# Patient Record
Sex: FEMALE | Race: BLACK OR AFRICAN AMERICAN | Employment: OTHER | URBAN - METROPOLITAN AREA
[De-identification: names, ages, dates, MRNs, and addresses within clinical notes are randomized per-mention and may not be internally consistent; named-entity substitution may affect disease eponyms.]

---

## 2017-03-07 ENCOUNTER — HOSPITAL ENCOUNTER (OUTPATIENT)
Dept: LAB | Age: 67
Discharge: HOME | End: 2017-03-07
Payer: MEDICARE

## 2017-03-07 ENCOUNTER — OFFICE VISIT (OUTPATIENT)
Dept: OBGYN CLINIC | Age: 67
End: 2017-03-07

## 2017-03-07 VITALS
DIASTOLIC BLOOD PRESSURE: 78 MMHG | WEIGHT: 189.6 LBS | SYSTOLIC BLOOD PRESSURE: 138 MMHG | HEART RATE: 82 BPM | BODY MASS INDEX: 29.76 KG/M2 | RESPIRATION RATE: 20 BRPM | HEIGHT: 67 IN | TEMPERATURE: 97.5 F

## 2017-03-07 DIAGNOSIS — Z90.710 H/O HYSTERECTOMY FOR BENIGN DISEASE: ICD-10-CM

## 2017-03-07 DIAGNOSIS — Z01.419 WELL WOMAN EXAM: Primary | ICD-10-CM

## 2017-03-07 DIAGNOSIS — B97.7 HPV IN FEMALE: ICD-10-CM

## 2017-03-07 PROCEDURE — 88175 CYTOPATH C/V AUTO FLUID REDO: CPT | Performed by: OBSTETRICS & GYNECOLOGY

## 2017-03-07 RX ORDER — FUROSEMIDE 40 MG/1
TABLET ORAL DAILY
COMMUNITY

## 2017-03-07 RX ORDER — LISINOPRIL 20 MG/1
TABLET ORAL DAILY
COMMUNITY

## 2017-03-07 RX ORDER — METFORMIN HYDROCHLORIDE 500 MG/1
1000 TABLET ORAL 2 TIMES DAILY WITH MEALS
COMMUNITY

## 2017-03-07 RX ORDER — ISOSORBIDE MONONITRATE 60 MG/1
TABLET, EXTENDED RELEASE ORAL
COMMUNITY

## 2017-03-07 RX ORDER — METOPROLOL TARTRATE 25 MG/1
TABLET, FILM COATED ORAL 2 TIMES DAILY
COMMUNITY

## 2017-03-07 RX ORDER — TRAMADOL HYDROCHLORIDE 50 MG/1
50 TABLET ORAL
COMMUNITY

## 2017-03-07 RX ORDER — LANOLIN ALCOHOL/MO/W.PET/CERES
CREAM (GRAM) TOPICAL 3 TIMES DAILY
COMMUNITY
End: 2017-09-18

## 2017-03-07 RX ORDER — AMLODIPINE BESYLATE 10 MG/1
10 TABLET ORAL DAILY
COMMUNITY

## 2017-03-07 NOTE — PATIENT INSTRUCTIONS

## 2017-03-07 NOTE — MR AVS SNAPSHOT
Visit Information Date & Time Provider Department Dept. Phone Encounter #  
 3/7/2017  9:30 AM Kezia Barnes OB/ [id number] Follow-up Instructions Return in about 2 years (around 3/7/2019) for well woman exam. Upcoming Health Maintenance Date Due FOBT Q 1 YEAR AGE 50-75 8/13/2000 ZOSTER VACCINE AGE 60> 8/13/2010 INFLUENZA AGE 9 TO ADULT 8/1/2016 BREAST CANCER SCRN MAMMOGRAM 7/19/2018 Allergies as of 3/7/2017  Review Complete On: 3/7/2017 By: Zane Montes LPN Severity Noted Reaction Type Reactions Penicillins High 03/02/2010    Hives, Itching, Swelling Shellfish Containing Products High 03/02/2010    Hives, Swelling Povidone-iodine  03/22/2016    Itching Prednisone Micronized (Bulk)  04/25/2012    Rash, Itching  
 hoarseness Spiriva With Handihaler [Tiotropium Bromide]  09/14/2012    Rash, Itching HAIRLOSS Current Immunizations  Reviewed on 9/22/2016 Name Date Hep A Vaccine 10/24/2012, 9/7/2011 Hep B Vaccine 10/24/2012, 3/28/2012, 9/7/2011 Influenza Vaccine 2/19/2013, 1/31/2012 Influenza Vaccine Whole 1/1/2005 Pneumococcal Vaccine (Unspecified Type) 12/31/2011, 10/1/2010 Tdap 2/13/2015 11:49 AM  
  
 Not reviewed this visit You Were Diagnosed With   
  
 Codes Comments Well woman exam    -  Primary ICD-10-CM: X22.884 ICD-9-CM: V72.31   
 H/O hysterectomy for benign disease     ICD-10-CM: Z90.710 ICD-9-CM: V88.01   
 HPV in female     ICD-10-CM: A63.0 ICD-9-CM: 079.4 Vitals BP Pulse Temp Resp Height(growth percentile) Weight(growth percentile) 138/78 (BP 1 Location: Left arm, BP Patient Position: Sitting) 82 97.5 °F (36.4 °C) (Oral) 20 5' 7\" (1.702 m) 189 lb 9.6 oz (86 kg) BMI OB Status Smoking Status 29.7 kg/m2 Hysterectomy Current Some Day Smoker Vitals History BMI and BSA Data Body Mass Index Body Surface Area 29.7 kg/m 2 2.02 m 2 Preferred Pharmacy Pharmacy Name Phone [facility] [address], [address] [address] [place] Phan. [phone] Your Updated Medication List  
  
   
This list is accurate as of: 3/7/17 10:12 AM.  Always use your most recent med list.  
  
  
  
  
 ADVAIR DISKUS 250-50 mcg/dose diskus inhaler Generic drug:  fluticasone-salmeterol Take 1 Puff by inhalation every twelve (12) hours. albuterol 90 mcg/actuation inhaler Commonly known as:  PROVENTIL HFA, VENTOLIN HFA, PROAIR HFA Take 2 puffs by inhalation every four (4) hours as needed for Wheezing. amitriptyline 50 mg tablet Commonly known as:  ELAVIL Take 25 mg by mouth nightly. amLODIPine 10 mg tablet Commonly known as:  Wayne Del Take  by mouth daily. * ammonium lactate 12 % lotion Commonly known as:  LAC-HYDRIN  
  
 * ammonium lactate 12 % topical cream  
Commonly known as:  AMLACTIN  
  
 diclofenac EC 75 mg EC tablet Commonly known as:  VOLTAREN  
75 mg two (2) times a day. ferrous sulfate 325 mg (65 mg iron) tablet Take  by mouth three (3) times daily. gabapentin 300 mg capsule Commonly known as:  NEURONTIN Take 300 mg by mouth three (3) times daily. HYDROcodone-acetaminophen 5-325 mg per tablet Commonly known as:  Juanetta Rasp Take  by mouth two (2) times daily as needed for Pain.  
  
 hydroxychloroquine 200 mg tablet Commonly known as:  PLAQUENIL Take 400 mg by mouth daily. isosorbide mononitrate ER 60 mg CR tablet Commonly known as:  IMDUR Take  by mouth every morning. LASIX 40 mg tablet Generic drug:  furosemide Take  by mouth daily. linezolid 600 mg tablet Commonly known as:  Canelo Liberian Take 1 Tab by mouth every twelve (12) hours. Indications: abscess  
  
 lisinopril 20 mg tablet Commonly known as:  Daiys Shabnam Take  by mouth daily. LORazepam 0.5 mg tablet Commonly known as:  ATIVAN  
 TAKE ONE TABLET BY MOUTH EVERY 8 HOURS AS NEEDED FOR ANXIETY  
  
 metFORMIN 500 mg tablet Commonly known as:  GLUCOPHAGE Take  by mouth two (2) times daily (with meals). metoprolol tartrate 25 mg tablet Commonly known as:  LOPRESSOR Take  by mouth two (2) times a day. moxifloxacin 400 mg tablet Commonly known as:  [place]m Take 1 Tab by mouth Daily (before breakfast). Indications: INTRA-ABDOMINAL ABSCESS  
  
 omeprazole 40 mg capsule Commonly known as:  PRILOSEC  
TAKE ONE CAPSULE BY MOUTH EVERY DAY  
  
 * oxyCODONE IR 10 mg Tab immediate release tablet Commonly known as:  Ever Ivans Take 10 mg by mouth as needed. * oxyCODONE IR 10 mg Tab immediate release tablet Commonly known as:  Ever Ivans Take 1 Tab by mouth four (4) times daily. Max Daily Amount: 40 mg.  
  
 oxymetazoline 0.025 % ophthalmic solution Commonly known as:  VISINE LR Administer 1 Drop to left eye every six (6) hours as needed. pravastatin 40 mg tablet Commonly known as:  PRAVACHOL Take 1 Tab by mouth nightly. Indications: HYPERCHOLESTEROLEMIA  
  
 silver sulfADIAZINE 1 % topical cream  
Commonly known as:  SILVADENE Apply  to affected area two (2) times a day. traMADol 50 mg tablet Commonly known as:  ULTRAM  
Take 50 mg by mouth every six (6) hours as needed for Pain.  
  
 triamcinolone acetonide 0.1 % topical cream  
Commonly known as:  KENALOG * Notice: This list has 4 medication(s) that are the same as other medications prescribed for you. Read the directions carefully, and ask your doctor or other care provider to review them with you. Follow-up Instructions Return in about 2 years (around 3/7/2019) for well woman exam.  
  
  
Patient Instructions Eating Healthy Foods: Care Instructions Your Care Instructions Eating healthy foods can help lower your risk for disease.  Healthy food gives you energy and keeps your heart strong, your brain active, your muscles working, and your bones strong. A healthy diet includes a variety of foods from the basic food groups: grains, vegetables, fruits, milk and milk products, and meat and beans. Some people may eat more of their favorite foods from only one food group and, as a result, miss getting the nutrients they need. So, it is important to pay attention not only to what you eat but also to what you are missing from your diet. You can eat a healthy, balanced diet by making a few small changes. Follow-up care is a key part of your treatment and safety. Be sure to make and go to all appointments, and call your doctor if you are having problems. Its also a good idea to know your test results and keep a list of the medicines you take. How can you care for yourself at home? Look at what you eat · Keep a food diary for a week or two and record everything you eat or drink. Track the number of servings you eat from each food group. · For a balanced diet every day, eat a variety of: ¨ 6 or more ounce-equivalents of grains, such as cereals, breads, crackers, rice, or pasta, every day. An ounce-equivalent is 1 slice of bread, 1 cup of ready-to-eat cereal, or ½ cup of cooked rice, cooked pasta, or cooked cereal. 
¨ 2½ cups of vegetables, especially: § Dark-green vegetables such as broccoli and spinach. § Orange vegetables such as carrots and sweet potatoes. § Dry beans (such as mata and kidney beans) and peas (such as lentils). ¨ 2 cups of fresh, frozen, or canned fruit. A small apple or 1 banana or orange equals 1 cup. ¨ 3 cups of nonfat or low-fat milk, yogurt, or other milk products. ¨ 5½ ounces of meat and beans, such as chicken, fish, lean meat, beans, nuts, and seeds. One egg, 1 tablespoon of peanut butter, ½ ounce nuts or seeds, or ¼ cup of cooked beans equals 1 ounce of meat. · Learn how to read food labels for serving sizes and ingredients. Fast-food and convenience-food meals often contain few or no fruits or vegetables. Make sure you eat some fruits and vegetables to make the meal more nutritious. · Look at your food diary. For each food group, add up what you have eaten and then divide the total by the number of days. This will give you an idea of how much you are eating from each food group. See if you can find some ways to change your diet to make it more healthy. Start small · Do not try to make dramatic changes to your diet all at once. You might feel that you are missing out on your favorite foods and then be more likely to fail. · Start slowly, and gradually change your habits. Try some of the following: ¨ Use whole wheat bread instead of white bread. ¨ Use nonfat or low-fat milk instead of whole milk. ¨ Eat brown rice instead of white rice, and eat whole wheat pasta instead of white-flour pasta. ¨ Try low-fat cheeses and low-fat yogurt. ¨ Add more fruits and vegetables to meals and have them for snacks. ¨ Add lettuce, tomato, cucumber, and onion to sandwiches. ¨ Add fruit to yogurt and cereal. 
Enjoy food · You can still eat your favorite foods. You just may need to eat less of them. If your favorite foods are high in fat, salt, and sugar, limit how often you eat them, but do not cut them out entirely. · Eat a wide variety of foods. Make healthy choices when eating out · The type of restaurant you choose can help you make healthy choices. Even fast-food chains are now offering more low-fat or healthier choices on the menu. · Choose smaller portions, or take half of your meal home. · When eating out, try: ¨ A veggie pizza with a whole wheat crust or grilled chicken (instead of sausage or pepperoni). ¨ Pasta with roasted vegetables, grilled chicken, or marinara sauce instead of cream sauce. ¨ A vegetable wrap or grilled chicken wrap. ¨ Broiled or poached food instead of fried or breaded items. Make healthy choices easy · Buy packaged, prewashed, ready-to-eat fresh vegetables and fruits, such as baby carrots, salad mixes, and chopped or shredded broccoli and cauliflower. · Buy packaged, presliced fruits, such as melon or pineapple. · Choose 100% fruit or vegetable juice instead of soda. Limit juice intake to 4 to 6 oz (½ to ¾ cup) a day. · Blend low-fat yogurt, fruit juice, and canned or frozen fruit to make a smoothie for breakfast or a snack. Where can you learn more? Go to http://milka-brenda.info/. Enter T756 in the search box to learn more about \"Eating Healthy Foods: Care Instructions. \" Current as of: November 20, 2015 Content Version: 11.1 © 0143-3588 [employer]. Care instructions adapted under license by [employer] (which disclaims liability or warranty for this information). If you have questions about a medical condition or this instruction, always ask your healthcare professional. [address] [place] [address] any warranty or liability for your use of this information. Introducing \[facility] [place] [facility]\"" & HEALTH SERVICES! Jah Ross introduces [employer] patient portal. Now you can access parts of your medical record, email your doctor's office, and request medication refills online. 1. In your internet browser, go to [email or web address] 2. Click on the First Time User? Click Here link in the Sign In box. You will see the New Member Sign Up page. 3. Enter your [employer] Access Code exactly as it appears below. You will not need to use this code after youve completed the sign-up process. If you do not sign up before the expiration date, you must request a new code. · [employer] Access Code: [email or web address] Expires: 3/29/2017  2:29 PM 
 
4. Enter the last four digits of your Social Security Number (xxxx) and Date of Birth (mm/dd/yyyy) as indicated and click Submit. You will be taken to the next sign-up page. 5. Create a [employer] ID. This will be your [employer] login ID and cannot be changed, so think of one that is secure and easy to remember. 6. Create a [employer] password. You can change your password at any time. 7. Enter your Password Reset Question and Answer. This can be used at a later time if you forget your password. 8. Enter your e-mail address. You will receive e-mail notification when new information is available in [address] 9. Click Sign Up. You can now view and download portions of your medical record. 10. Click the Download Summary menu link to download a portable copy of your medical information. If you have questions, please visit the Frequently Asked Questions section of the [employer] website. Remember, [employer] is NOT to be used for urgent needs. For medical emergencies, dial 911. Now available from your iPhone and Android! Please provide this summary of care documentation to your next provider. Your primary care clinician is listed as Ray Browne. If you have any questions after today's visit, please call [phone].

## 2017-03-15 NOTE — PROGRESS NOTES
NEW PATIENT EXAM  Kamille/Post Menopause    SUBJECTIVE: Ashwin Pruitt is a 77 y.o. female who presents for annual exam. No LMP recorded. Patient has had a hysterectomy. GYN History  Menopause:  50  Post menopausal bleeding:   NO      Last pap: 2012  The prior Pap result: hpv  Last Mammogram:  7/2016  Last Dexa Scan: 2015    Past Medical History:   Diagnosis Date    Anxiety state, unspecified 2001    Dr. Linder [facility] Asthma childhood    Dr. Flori Lovell and     CAD (coronary artery disease)     Dr. Lise Crenshaw at [facility] Cellulitis and abscess of leg 05/01/10    Right LE. Hospitalized    Chickenpox childhood    Chronic ITP (idiopathic thrombocytopenia) (HCC)     Dr. Oswaldo Isaacs. Dr. Emily Clement    Chronic obstructive pulmonary disease [facility])    Cecelia Moritz 2001    Dr. Reyna Donovan.  Diabetes ([facility] [address] 2000    Diverticulosis of sigmoid 04/2010    Dr. Herberth Vasquez    HCV (hepatitis C virus) 2004     Dr. Sawyer March - took Arti Bingham finish 1/2016    Hemorrhoids 2010    Internal.  Dr. Malyl Tafoya High cholesterol 2000    Hypertension 2000    Ill-defined condition     high cholesterol    Knee pain     Right. Dr. Javier Hi.  Low back pain 12/27/2009    Dr. Sruthi Hunt.  Rectal bleeding     Right sided sciatica 01/24/10    Dr. Juliann Tomas [facility]) 2013    [facility]. Alex Dias at [facility]. Dr. Melissa Cooley, pulm.  Toe ulcer, right ([facility] [address] 2011    Right great toe. Dr. Sudeep Wright of toe ([facility] [address] 05/01/10    Dr. Jesus Ren    Unspecified vitamin D deficiency 10/2011    Ureteral stone 08/2010    Dr. Marybel Barksdale     Past Surgical History:   Procedure Laterality Date    CABG, ARTERIAL, SINGLE  2005    Dr. Burt Craig  2004? CABG x1 @ [facility]    COLONOSCOPY  04/09/10    Dr. José Miguel Andre  08/2010    Dr. Silverio Louis    EGD  04/09/10    Dr. Herberth Vasquez. due q 10 yrs.     HC LUMB/SACRAL EPID INJ W/CATH SURG & FLUORO EX  02/09/10    due to lumbar radiculopathy and slipped disk. Dr. Sigifredo Collins and Dr. Calvin Whitney, radiologist    HX HEART CATHETERIZATION  2009    HX HERNIA REPAIR  11/30/2009    Rt ventral.  Dr. Neil Floor    due to fibroids.  HX SPLENECTOMY  2004    [facility]   Dr. Donna Gomezinter.  due to low plateletes    HX TONSILLECTOMY       OB History     No data available        Family History   Problem Relation Age of Onset    Diabetes Mother     Heart Disease Mother     Hypertension Mother     Other Mother      severe diverticulitis    Arthritis-osteo Mother     Bipolar Disorder Sister     No Known Problems Brother     Heart Disease Maternal Grandfather     Cancer Maternal Grandfather      colon    Hypertension Maternal Grandfather     Stroke Maternal Grandfather      Social History     Social History    Marital status: SINGLE     Spouse name: N/A    Number of children: N/A    Years of education: N/A     Occupational History    Not on file. Social History Main Topics    Smoking status: Current Some Day Smoker     Packs/day: 1.00     Years: 35.00     Types: Cigarettes    Smokeless tobacco: Never Used    Alcohol use Yes      Comment: socially    Drug use: No    Sexual activity: No     Other Topics Concern    Not on file     Social History Narrative       Current Outpatient Prescriptions   Medication Sig Dispense Refill    amLODIPine (NORVASC) 10 mg tablet Take  by mouth daily.  ferrous sulfate 325 mg (65 mg iron) tablet Take  by mouth three (3) times daily.  furosemide (LASIX) 40 mg tablet Take  by mouth daily.  isosorbide mononitrate ER (IMDUR) 60 mg CR tablet Take  by mouth every morning.  lisinopril (PRINIVIL, ZESTRIL) 20 mg tablet Take  by mouth daily.  metFORMIN (GLUCOPHAGE) 500 mg tablet Take  by mouth two (2) times daily (with meals).  metoprolol tartrate (LOPRESSOR) 25 mg tablet Take  by mouth two (2) times a day.       traMADol (ULTRAM) 50 mg tablet Take 50 mg by mouth every six (6) hours as needed for Pain.  oxyCODONE IR (ROXICODONE) 10 mg tab immediate release tablet Take 1 Tab by mouth four (4) times daily. Max Daily Amount: 40 mg. 20 Tab 0    HYDROcodone-acetaminophen (NORCO) 5-325 mg per tablet Take  by mouth two (2) times daily as needed for Pain.  oxyCODONE IR (ROXICODONE) 10 mg tab immediate release tablet Take 10 mg by mouth as needed.  silver sulfADIAZINE (SILVADENE) 1 % topical cream Apply  to affected area two (2) times a day.  gabapentin (NEURONTIN) 300 mg capsule Take 300 mg by mouth three (3) times daily.  ammonium lactate (AMLACTIN) 12 % topical cream       ammonium lactate (LAC-HYDRIN) 12 % lotion       diclofenac EC (VOLTAREN) 75 mg EC tablet 75 mg two (2) times a day.  triamcinolone acetonide (KENALOG) 0.1 % topical cream       amitriptyline (ELAVIL) 50 mg tablet Take 25 mg by mouth nightly.  LORazepam (ATIVAN) 0.5 mg tablet TAKE ONE TABLET BY MOUTH EVERY 8 HOURS AS NEEDED FOR ANXIETY 30 tablet 0    omeprazole (PRILOSEC) 40 mg capsule TAKE ONE CAPSULE BY MOUTH EVERY DAY 30 capsule 0    albuterol (PROVENTIL HFA, VENTOLIN HFA, PROAIR HFA) 90 mcg/actuation inhaler Take 2 puffs by inhalation every four (4) hours as needed for Wheezing. 1 Inhaler 5    fluticasone-salmeterol (ADVAIR DISKUS) 250-50 mcg/dose diskus inhaler Take 1 Puff by inhalation every twelve (12) hours.  pravastatin (PRAVACHOL) 40 mg tablet Take 1 Tab by mouth nightly. Indications: HYPERCHOLESTEROLEMIA 90 Tab 3    oxymetazoline (VISINE LR) 0.025 % ophthalmic solution Administer 1 Drop to left eye every six (6) hours as needed. 15 mL 0    moxifloxacin (AVELOX) 400 mg tablet Take 1 Tab by mouth Daily (before breakfast). Indications: INTRA-ABDOMINAL ABSCESS 7 Tab 0    linezolid (ZYVOX) 600 mg tablet Take 1 Tab by mouth every twelve (12) hours.  Indications: abscess 14 Tab 0    hydroxychloroquine (PLAQUENIL) 200 mg tablet Take 400 mg by mouth daily. Review of Systems:   Complete review of systems reviewed from social and history data forms. Pertinent positives in HPI. Objective:     Visit Vitals    /78 (BP 1 Location: Left arm, BP Patient Position: Sitting)    Pulse 82    Temp 97.5 °F (36.4 °C) (Oral)    Resp 20    Ht 5' 7\" (1.702 m)    Wt 189 lb 9.6 oz (86 kg)    BMI 29.7 kg/m2       General:  alert, cooperative, no distress, appears stated age   Skin:  Normal.   Lymph Nodes:  Cervical, supraclavicular, and axillary nodes normal.   Breast Exam: normal appearance, no masses or tenderness    Lungs:  clear to auscultation bilaterally   Heart:  regular rate and rhythm, S1, S2 normal, no murmur, click, rub or gallop   Abdomen: soft, non-tender. No masses,  no organomegaly   Back:  Costovertebral angle tenderness absent   Genitourinary: BUS normal. Introitus normal. Normal appearing vaginal epithelium, Vaginal discharge described as normal and physiologic.,  Adnexa normal in size left and right without tenderness. Extremities:  extremities normal, atraumatic, no cyanosis or edema     Neurologic:  negative   Psychiatric:  non focal     ASSESSMENT:      ICD-10-CM ICD-9-CM    1. Well woman exam Z01.419 V72.31 PAP IG, RFX HPV ASCU, 95&32,52([id number])   2. H/O hysterectomy for benign disease Z90.710 V88.01 PAP IG, RFX HPV ASCU, 16&18,45([id number])   3. HPV in female A63.0 079.4 PAP IG, RFX HPV ASCU, 02&53,47([id number])        Follow-up Disposition:  Return in about 2 years (around 3/7/2019) for well woman exam.    Today's care was reviewed and discussed with the patient. She expresses understanding and approval of today's plan.     Lee Zuleta MD

## 2017-04-25 ENCOUNTER — HOSPITAL ENCOUNTER (OUTPATIENT)
Dept: CT IMAGING | Age: 67
Discharge: HOME | End: 2017-04-25
Attending: FAMILY MEDICINE
Payer: MEDICARE

## 2017-04-25 DIAGNOSIS — K46.9 HERNIA, ABDOMINAL: ICD-10-CM

## 2017-04-25 PROCEDURE — 74011636320 HC RX REV CODE- 636/320: Performed by: FAMILY MEDICINE

## 2017-04-25 PROCEDURE — 74177 CT ABD & PELVIS W/CONTRAST: CPT

## 2017-04-25 RX ORDER — SODIUM CHLORIDE 0.9 % (FLUSH) 0.9 %
5-10 SYRINGE (ML) INJECTION
Status: COMPLETED | OUTPATIENT
Start: 2017-04-25 | End: 2017-04-25

## 2017-04-25 RX ADMIN — IOPAMIDOL 100 ML: 755 INJECTION, SOLUTION INTRAVENOUS at 11:03

## 2017-04-25 RX ADMIN — Medication 10 ML: at 11:04

## 2017-07-12 ENCOUNTER — HOSPITAL ENCOUNTER (EMERGENCY)
Age: 67
Discharge: HOME | End: 2017-07-12
Attending: EMERGENCY MEDICINE
Payer: MEDICARE

## 2017-07-12 VITALS
TEMPERATURE: 98.4 F | RESPIRATION RATE: 18 BRPM | WEIGHT: 192 LBS | BODY MASS INDEX: 30.13 KG/M2 | DIASTOLIC BLOOD PRESSURE: 65 MMHG | HEIGHT: 67 IN | OXYGEN SATURATION: 94 % | SYSTOLIC BLOOD PRESSURE: 122 MMHG | HEART RATE: 64 BPM

## 2017-07-12 DIAGNOSIS — L03.116 LEFT LEG CELLULITIS: Primary | ICD-10-CM

## 2017-07-12 LAB
ALBUMIN SERPL BCP-MCNC: 3.2 G/DL (ref 3.5–5)
ALBUMIN/GLOB SERPL: 0.5 {RATIO} (ref 1.1–2.2)
ALP SERPL-CCNC: 107 U/L (ref 45–117)
ALT SERPL-CCNC: 17 U/L (ref 12–78)
ANION GAP BLD CALC-SCNC: 5 MMOL/L (ref 5–15)
AST SERPL W P-5'-P-CCNC: 45 U/L (ref 15–37)
BASOPHILS # BLD AUTO: 0.1 K/UL (ref 0–0.1)
BASOPHILS # BLD: 1 % (ref 0–1)
BILIRUB SERPL-MCNC: 0.5 MG/DL (ref 0.2–1)
BUN SERPL-MCNC: 15 MG/DL (ref 6–20)
BUN/CREAT SERPL: 17 (ref 12–20)
CALCIUM SERPL-MCNC: 8.8 MG/DL (ref 8.5–10.1)
CHLORIDE SERPL-SCNC: 105 MMOL/L (ref 97–108)
CO2 SERPL-SCNC: 29 MMOL/L (ref 21–32)
CREAT SERPL-MCNC: 0.89 MG/DL (ref 0.55–1.02)
DIFFERENTIAL METHOD BLD: ABNORMAL
EOSINOPHIL # BLD: 0.2 K/UL (ref 0–0.4)
EOSINOPHIL NFR BLD: 2 % (ref 0–7)
ERYTHROCYTE [DISTWIDTH] IN BLOOD BY AUTOMATED COUNT: 15.2 % (ref 11.5–14.5)
GLOBULIN SER CALC-MCNC: 6.2 G/DL (ref 2–4)
GLUCOSE BLD STRIP.AUTO-MCNC: 104 MG/DL (ref 65–100)
GLUCOSE SERPL-MCNC: 107 MG/DL (ref 65–100)
HCT VFR BLD AUTO: 41 % (ref 35–47)
HGB BLD-MCNC: 13.1 G/DL (ref 11.5–16)
LYMPHOCYTES # BLD AUTO: 18 % (ref 12–49)
LYMPHOCYTES # BLD: 1.5 K/UL (ref 0.8–3.5)
MCH RBC QN AUTO: 28.2 PG (ref 26–34)
MCHC RBC AUTO-ENTMCNC: 32 G/DL (ref 30–36.5)
MCV RBC AUTO: 88.2 FL (ref 80–99)
MONOCYTES # BLD: 0.7 K/UL (ref 0–1)
MONOCYTES NFR BLD AUTO: 9 % (ref 5–13)
NEUTS SEG # BLD: 5.6 K/UL (ref 1.8–8)
NEUTS SEG NFR BLD AUTO: 70 % (ref 32–75)
PLATELET # BLD AUTO: 96 K/UL (ref 150–400)
POTASSIUM SERPL-SCNC: 6.4 MMOL/L (ref 3.5–5.1)
PROT SERPL-MCNC: 9.4 G/DL (ref 6.4–8.2)
RBC # BLD AUTO: 4.65 M/UL (ref 3.8–5.2)
RBC MORPH BLD: ABNORMAL
SERVICE CMNT-IMP: ABNORMAL
SODIUM SERPL-SCNC: 139 MMOL/L (ref 136–145)
WBC # BLD AUTO: 8.1 K/UL (ref 3.6–11)

## 2017-07-12 PROCEDURE — 96365 THER/PROPH/DIAG IV INF INIT: CPT

## 2017-07-12 PROCEDURE — 99283 EMERGENCY DEPT VISIT LOW MDM: CPT

## 2017-07-12 PROCEDURE — 82962 GLUCOSE BLOOD TEST: CPT

## 2017-07-12 PROCEDURE — 74011250636 HC RX REV CODE- 250/636: Performed by: PHYSICIAN ASSISTANT

## 2017-07-12 PROCEDURE — 85025 COMPLETE CBC W/AUTO DIFF WBC: CPT | Performed by: PHYSICIAN ASSISTANT

## 2017-07-12 PROCEDURE — 36415 COLL VENOUS BLD VENIPUNCTURE: CPT | Performed by: PHYSICIAN ASSISTANT

## 2017-07-12 PROCEDURE — 80053 COMPREHEN METABOLIC PANEL: CPT | Performed by: PHYSICIAN ASSISTANT

## 2017-07-12 RX ORDER — CLINDAMYCIN PHOSPHATE 600 MG/50ML
600 INJECTION INTRAVENOUS
Status: COMPLETED | OUTPATIENT
Start: 2017-07-12 | End: 2017-07-12

## 2017-07-12 RX ORDER — CLINDAMYCIN HYDROCHLORIDE 300 MG/1
300 CAPSULE ORAL 4 TIMES DAILY
Qty: 40 CAP | Refills: 0 | Status: SHIPPED | OUTPATIENT
Start: 2017-07-12 | End: 2017-07-22

## 2017-07-12 RX ADMIN — CLINDAMYCIN PHOSPHATE 600 MG: 600 INJECTION INTRAVENOUS at 12:41

## 2017-07-12 NOTE — ED NOTES
Patient (s)  given copy of dc instructions and 1 paper script(s) and electronic scripts. Patient (s)  verbalized understanding of instructions and script (s). Patient given a current medication reconciliation form and verbalized understanding of their medications. Patient (s) verbalized understanding of the importance of discussing medications with  his or her physician or clinic they will be following up with. Patient alert and oriented and in no acute distress. Patient offered wheelchair from treatment area to hospital entrance, patient declined wheelchair.

## 2017-07-12 NOTE — DISCHARGE INSTRUCTIONS

## 2017-07-12 NOTE — ED PROVIDER NOTES
Patient is a 77 y.o. female presenting with leg pain. The history is provided by the patient. Leg Pain    This is a new problem. Episode onset: 1 wk pt states she fell out of the truck and scraped her leg. The problem occurs constantly. The problem has been gradually worsening (2-3 days ago pt states leg started swelling). The pain is present in the left lower leg. The pain is at a severity of 0/10. The patient is experiencing no pain. Pertinent negatives include no numbness and no tingling. The symptoms are aggravated by palpation. Treatments tried: tylenol and tramadol. The treatment provided no relief. There has been a history of trauma. Past Medical History:   Diagnosis Date    Anxiety state, unspecified 2001    Dr. Albright Pica Asthma childhood    Dr. Kayleigh King and     CAD (coronary artery disease)     Dr. Jorge Lechuga at [facility] Cellulitis and abscess of leg 05/01/10    Right LE. Hospitalized    Chickenpox childhood    Chronic ITP (idiopathic thrombocytopenia) (HCC)     Dr. Irma Peña. Dr. Valerio Phi    Chronic obstructive pulmonary disease [facility])    Brunilda Gonzales 2001    Dr. Boris Leigh.  Diabetes ([facility] [address] 2000    Diverticulosis of sigmoid 04/2010    Dr. Ernesto Arambula    HCV (hepatitis C virus) 2004     Dr. Flor Solorzano Adjutant finish 1/2016    Hemorrhoids 2010    Internal.  Dr. Bethany Alejo High cholesterol 2000    Hypertension 2000    Ill-defined condition     high cholesterol    Knee pain     Right. Dr. Chapo Padron.  Low back pain 12/27/2009    Dr. Juno Gates.  Rectal bleeding     Right sided sciatica 01/24/10    Dr. Tyesha Cain [facility]) 2013    Dr. Fco Sarmiento at [facility]. Dr. Perla Mims, pulm.  Toe ulcer, right ([facility] [address] 2011    Right great toe.   Dr. Noriega [facility] of toe ([facility] [address] 05/01/10    Dr. Shira Lopez    Unspecified vitamin D deficiency 10/2011    Ureteral stone 08/2010    Dr. Francisco Chris       Past Surgical History:   Procedure Laterality Date    CABG, ARTERIAL, SINGLE  2005    Dr. Jitendra De Guzman  2004? CABG x1 @ [facility]    COLONOSCOPY  04/09/10    Dr. Mg Gomez  08/2010    Dr. Suki Arauz    EGD  04/09/10    Dr. Raven Land. due q 10 yrs.  HC LUMB/SACRAL EPID INJ W/CATH SURG & FLUORO EX  02/09/10    due to lumbar radiculopathy and slipped disk. Dr. Dipika Toscano and Dr. Dustin Quevedo, radiologist    HX HEART CATHETERIZATION  2009    HX HERNIA REPAIR  11/30/2009    Rt ventral.  Dr. Radha Rivera    due to fibroids.  HX SPLENECTOMY  2004    [facility]   Dr. Shane Christopher.  due to low plateletes    HX TONSILLECTOMY           Family History:   Problem Relation Age of Onset    Diabetes Mother     Heart Disease Mother     Hypertension Mother     Other Mother      severe diverticulitis    Arthritis-osteo Mother     Bipolar Disorder Sister     No Known Problems Brother     Heart Disease Maternal Grandfather     Cancer Maternal Grandfather      colon    Hypertension Maternal Grandfather     Stroke Maternal Grandfather        Social History     Social History    Marital status: SINGLE     Spouse name: N/A    Number of children: N/A    Years of education: N/A     Occupational History    Not on file. Social History Main Topics    Smoking status: Current Some Day Smoker     Packs/day: 1.00     Years: 35.00     Types: Cigarettes    Smokeless tobacco: Never Used    Alcohol use Yes      Comment: socially    Drug use: No    Sexual activity: No     Other Topics Concern    Not on file     Social History Narrative         ALLERGIES: Penicillins; Shellfish containing products; Povidone-iodine; Prednisone micronized (bulk); and Spiriva with handihaler [tiotropium bromide]    Review of Systems   Constitutional: Negative for fever. Respiratory: Negative for shortness of breath. Cardiovascular: Negative for chest pain. Musculoskeletal: Positive for joint swelling.    Skin: Positive for color change and rash. Neurological: Negative for tingling, speech difficulty, weakness and numbness. Psychiatric/Behavioral: Positive for self-injury. All other systems reviewed and are negative. Vitals:    07/12/17 1145   BP: 126/78   Pulse: 72   Resp: 18   Temp: 98 °F (36.7 °C)   SpO2: 94%   Weight: 87.1 kg (192 lb)   Height: 5' 7\" (1.702 m)            Physical Exam   Constitutional: She is oriented to person, place, and time. She appears well-developed and well-nourished. No distress. HENT:   Head: Normocephalic and atraumatic. Eyes: Conjunctivae are normal.   Cardiovascular: Normal rate, regular rhythm and normal heart sounds. Pulmonary/Chest: Effort normal and breath sounds normal. No respiratory distress. She has no wheezes. She has no rales. Musculoskeletal: Normal range of motion. Neurological: She is alert and oriented to person, place, and time. Skin: Skin is warm and dry. There is erythema (of LLE distal 2/3rds of leg, shiny in appearance, slight swelling and minimal TTP). Psychiatric: She has a normal mood and affect. Her behavior is normal. Judgment and thought content normal.   Nursing note and vitals reviewed.        MDM  Number of Diagnoses or Management Options  Left leg cellulitis:   Diagnosis management comments: DDX: cellulitis, allergic reaction, contact dermatitis, hyperglycemia       Amount and/or Complexity of Data Reviewed  Clinical lab tests: ordered and reviewed      ED Course       Procedures

## 2017-07-12 NOTE — ED NOTES
Emergency Department Nursing Plan of Care       The Nursing Plan of Care is developed from the Nursing assessment and Emergency Department Attending provider initial evaluation. The plan of care may be reviewed in the ED Provider note.     The Plan of Care was developed with the following considerations:   Patient / Family readiness to learn indicated by:verbalized understanding  Persons(s) to be included in education: patient  Barriers to Learning/Limitations:No    [address], RN    7/12/2017   11:50 AM    See Assessment

## 2017-09-18 ENCOUNTER — APPOINTMENT (OUTPATIENT)
Dept: CT IMAGING | Age: 67
End: 2017-09-18
Attending: EMERGENCY MEDICINE
Payer: MEDICARE

## 2017-09-18 ENCOUNTER — HOSPITAL ENCOUNTER (EMERGENCY)
Age: 67
Discharge: HOME | End: 2017-09-18
Attending: EMERGENCY MEDICINE
Payer: MEDICARE

## 2017-09-18 ENCOUNTER — APPOINTMENT (OUTPATIENT)
Dept: GENERAL RADIOLOGY | Age: 67
End: 2017-09-18
Attending: EMERGENCY MEDICINE
Payer: MEDICARE

## 2017-09-18 ENCOUNTER — APPOINTMENT (OUTPATIENT)
Dept: NUCLEAR MEDICINE | Age: 67
End: 2017-09-18
Attending: EMERGENCY MEDICINE
Payer: MEDICARE

## 2017-09-18 VITALS
OXYGEN SATURATION: 99 % | TEMPERATURE: 98.3 F | HEIGHT: 67 IN | HEART RATE: 83 BPM | BODY MASS INDEX: 30.53 KG/M2 | SYSTOLIC BLOOD PRESSURE: 143 MMHG | DIASTOLIC BLOOD PRESSURE: 74 MMHG | RESPIRATION RATE: 16 BRPM | WEIGHT: 194.5 LBS

## 2017-09-18 DIAGNOSIS — R42 DIZZINESS: ICD-10-CM

## 2017-09-18 DIAGNOSIS — J44.1 ACUTE EXACERBATION OF CHRONIC OBSTRUCTIVE PULMONARY DISEASE (COPD) (HCC): Primary | ICD-10-CM

## 2017-09-18 DIAGNOSIS — R06.02 SOB (SHORTNESS OF BREATH): ICD-10-CM

## 2017-09-18 LAB
ALBUMIN SERPL-MCNC: 3.2 G/DL (ref 3.5–5)
ALBUMIN/GLOB SERPL: 0.6 {RATIO} (ref 1.1–2.2)
ALP SERPL-CCNC: 88 U/L (ref 45–117)
ALT SERPL-CCNC: 16 U/L (ref 12–78)
ANION GAP SERPL CALC-SCNC: 9 MMOL/L (ref 5–15)
APPEARANCE UR: ABNORMAL
AST SERPL-CCNC: 21 U/L (ref 15–37)
ATRIAL RATE: 78 BPM
BACTERIA URNS QL MICRO: ABNORMAL /HPF
BASOPHILS # BLD: 0 K/UL (ref 0–0.1)
BASOPHILS NFR BLD: 0 % (ref 0–1)
BILIRUB SERPL-MCNC: 0.4 MG/DL (ref 0.2–1)
BILIRUB UR QL: NEGATIVE
BNP SERPL-MCNC: 809 PG/ML (ref 0–125)
BUN SERPL-MCNC: 20 MG/DL (ref 6–20)
BUN/CREAT SERPL: 20 (ref 12–20)
CALCIUM SERPL-MCNC: 8.9 MG/DL (ref 8.5–10.1)
CALCULATED P AXIS, ECG09: 56 DEGREES
CALCULATED R AXIS, ECG10: 69 DEGREES
CALCULATED T AXIS, ECG11: 89 DEGREES
CHLORIDE SERPL-SCNC: 101 MMOL/L (ref 97–108)
CK MB CFR SERPL CALC: 1.1 % (ref 0–2.5)
CK MB SERPL-MCNC: 1 NG/ML (ref 5–25)
CK SERPL-CCNC: 89 U/L (ref 26–192)
CO2 SERPL-SCNC: 26 MMOL/L (ref 21–32)
COLOR UR: ABNORMAL
CREAT SERPL-MCNC: 1.02 MG/DL (ref 0.55–1.02)
D DIMER PPP FEU-MCNC: 1.29 MG/L FEU (ref 0–0.65)
DIAGNOSIS, 93000: NORMAL
DIFFERENTIAL METHOD BLD: ABNORMAL
EOSINOPHIL # BLD: 0.1 K/UL (ref 0–0.4)
EOSINOPHIL NFR BLD: 1 % (ref 0–7)
EPITH CASTS URNS QL MICRO: ABNORMAL /LPF
ERYTHROCYTE [DISTWIDTH] IN BLOOD BY AUTOMATED COUNT: 15.3 % (ref 11.5–14.5)
GLOBULIN SER CALC-MCNC: 5.7 G/DL (ref 2–4)
GLUCOSE SERPL-MCNC: 168 MG/DL (ref 65–100)
GLUCOSE UR STRIP.AUTO-MCNC: NEGATIVE MG/DL
HCT VFR BLD AUTO: 38.1 % (ref 35–47)
HGB BLD-MCNC: 12.4 G/DL (ref 11.5–16)
HGB UR QL STRIP: ABNORMAL
KETONES UR QL STRIP.AUTO: NEGATIVE MG/DL
LACTATE SERPL-SCNC: 1.9 MMOL/L (ref 0.4–2)
LEUKOCYTE ESTERASE UR QL STRIP.AUTO: ABNORMAL
LIPASE SERPL-CCNC: 204 U/L (ref 73–393)
LYMPHOCYTES # BLD: 4.9 K/UL (ref 0.8–3.5)
LYMPHOCYTES NFR BLD: 33 % (ref 12–49)
MCH RBC QN AUTO: 28.6 PG (ref 26–34)
MCHC RBC AUTO-ENTMCNC: 32.5 G/DL (ref 30–36.5)
MCV RBC AUTO: 87.8 FL (ref 80–99)
MONOCYTES # BLD: 1.6 K/UL (ref 0–1)
MONOCYTES NFR BLD: 11 % (ref 5–13)
NEUTS BAND NFR BLD MANUAL: 1 % (ref 0–6)
NEUTS SEG # BLD: 8.2 K/UL (ref 1.8–8)
NEUTS SEG NFR BLD: 54 % (ref 32–75)
NITRITE UR QL STRIP.AUTO: NEGATIVE
P-R INTERVAL, ECG05: 152 MS
PH UR STRIP: 6 [PH] (ref 5–8)
PLATELET # BLD AUTO: ABNORMAL K/UL (ref 150–400)
POTASSIUM SERPL-SCNC: 4.1 MMOL/L (ref 3.5–5.1)
PROT SERPL-MCNC: 8.9 G/DL (ref 6.4–8.2)
PROT UR STRIP-MCNC: 100 MG/DL
Q-T INTERVAL, ECG07: 410 MS
QRS DURATION, ECG06: 86 MS
QTC CALCULATION (BEZET), ECG08: 467 MS
RBC # BLD AUTO: 4.34 M/UL (ref 3.8–5.2)
RBC #/AREA URNS HPF: ABNORMAL /HPF (ref 0–5)
RBC MORPH BLD: ABNORMAL
SODIUM SERPL-SCNC: 136 MMOL/L (ref 136–145)
SP GR UR REFRACTOMETRY: 1.02 (ref 1–1.03)
TROPONIN I SERPL-MCNC: <0.04 NG/ML
UA: UC IF INDICATED,UAUC: ABNORMAL
UROBILINOGEN UR QL STRIP.AUTO: 0.2 EU/DL (ref 0.2–1)
VENTRICULAR RATE, ECG03: 78 BPM
WBC # BLD AUTO: 14.8 K/UL (ref 3.6–11)
WBC URNS QL MICRO: ABNORMAL /HPF (ref 0–4)

## 2017-09-18 PROCEDURE — 74011636320 HC RX REV CODE- 636/320: Performed by: EMERGENCY MEDICINE

## 2017-09-18 PROCEDURE — 83605 ASSAY OF LACTIC ACID: CPT | Performed by: EMERGENCY MEDICINE

## 2017-09-18 PROCEDURE — 74011250636 HC RX REV CODE- 250/636: Performed by: EMERGENCY MEDICINE

## 2017-09-18 PROCEDURE — 85025 COMPLETE CBC W/AUTO DIFF WBC: CPT | Performed by: EMERGENCY MEDICINE

## 2017-09-18 PROCEDURE — 94640 AIRWAY INHALATION TREATMENT: CPT

## 2017-09-18 PROCEDURE — 96375 TX/PRO/DX INJ NEW DRUG ADDON: CPT

## 2017-09-18 PROCEDURE — 71010 XR CHEST PORT: CPT

## 2017-09-18 PROCEDURE — 96374 THER/PROPH/DIAG INJ IV PUSH: CPT

## 2017-09-18 PROCEDURE — 83880 ASSAY OF NATRIURETIC PEPTIDE: CPT | Performed by: EMERGENCY MEDICINE

## 2017-09-18 PROCEDURE — 87040 BLOOD CULTURE FOR BACTERIA: CPT | Performed by: EMERGENCY MEDICINE

## 2017-09-18 PROCEDURE — 74011000250 HC RX REV CODE- 250: Performed by: EMERGENCY MEDICINE

## 2017-09-18 PROCEDURE — 99285 EMERGENCY DEPT VISIT HI MDM: CPT

## 2017-09-18 PROCEDURE — 82550 ASSAY OF CK (CPK): CPT | Performed by: EMERGENCY MEDICINE

## 2017-09-18 PROCEDURE — 77030029684 HC NEB SM VOL KT MONA -A

## 2017-09-18 PROCEDURE — 71275 CT ANGIOGRAPHY CHEST: CPT

## 2017-09-18 PROCEDURE — 74176 CT ABD & PELVIS W/O CONTRAST: CPT

## 2017-09-18 PROCEDURE — 87086 URINE CULTURE/COLONY COUNT: CPT | Performed by: EMERGENCY MEDICINE

## 2017-09-18 PROCEDURE — 36415 COLL VENOUS BLD VENIPUNCTURE: CPT | Performed by: EMERGENCY MEDICINE

## 2017-09-18 PROCEDURE — A9558 XE133 XENON 10MCI: HCPCS

## 2017-09-18 PROCEDURE — 80053 COMPREHEN METABOLIC PANEL: CPT | Performed by: EMERGENCY MEDICINE

## 2017-09-18 PROCEDURE — 84484 ASSAY OF TROPONIN QUANT: CPT | Performed by: EMERGENCY MEDICINE

## 2017-09-18 PROCEDURE — 85379 FIBRIN DEGRADATION QUANT: CPT | Performed by: EMERGENCY MEDICINE

## 2017-09-18 PROCEDURE — 81001 URINALYSIS AUTO W/SCOPE: CPT | Performed by: EMERGENCY MEDICINE

## 2017-09-18 PROCEDURE — 83690 ASSAY OF LIPASE: CPT | Performed by: EMERGENCY MEDICINE

## 2017-09-18 PROCEDURE — 96376 TX/PRO/DX INJ SAME DRUG ADON: CPT

## 2017-09-18 PROCEDURE — 93005 ELECTROCARDIOGRAM TRACING: CPT

## 2017-09-18 RX ORDER — LORAZEPAM 0.5 MG/1
0.5 TABLET ORAL 2 TIMES DAILY
COMMUNITY

## 2017-09-18 RX ORDER — SODIUM CHLORIDE 0.9 % (FLUSH) 0.9 %
5-10 SYRINGE (ML) INJECTION
Status: DISCONTINUED | OUTPATIENT
Start: 2017-09-18 | End: 2017-09-18 | Stop reason: HOSPADM

## 2017-09-18 RX ORDER — ALBUTEROL SULFATE 0.83 MG/ML
5 SOLUTION RESPIRATORY (INHALATION)
Status: COMPLETED | OUTPATIENT
Start: 2017-09-18 | End: 2017-09-18

## 2017-09-18 RX ORDER — ONDANSETRON 2 MG/ML
4 INJECTION INTRAMUSCULAR; INTRAVENOUS
Status: COMPLETED | OUTPATIENT
Start: 2017-09-18 | End: 2017-09-18

## 2017-09-18 RX ORDER — CLINDAMYCIN HYDROCHLORIDE 300 MG/1
300 CAPSULE ORAL 3 TIMES DAILY
COMMUNITY

## 2017-09-18 RX ORDER — ONDANSETRON 2 MG/ML
8 INJECTION INTRAMUSCULAR; INTRAVENOUS
Status: COMPLETED | OUTPATIENT
Start: 2017-09-18 | End: 2017-09-18

## 2017-09-18 RX ORDER — SODIUM CHLORIDE 0.9 % (FLUSH) 0.9 %
10 SYRINGE (ML) INJECTION AS NEEDED
Status: DISCONTINUED | OUTPATIENT
Start: 2017-09-18 | End: 2017-09-18 | Stop reason: HOSPADM

## 2017-09-18 RX ORDER — OMEPRAZOLE 20 MG/1
20 CAPSULE, DELAYED RELEASE ORAL DAILY
COMMUNITY

## 2017-09-18 RX ORDER — DIPHENHYDRAMINE HYDROCHLORIDE 50 MG/ML
50 INJECTION, SOLUTION INTRAMUSCULAR; INTRAVENOUS
Status: COMPLETED | OUTPATIENT
Start: 2017-09-18 | End: 2017-09-18

## 2017-09-18 RX ORDER — GABAPENTIN 600 MG/1
600 TABLET ORAL 3 TIMES DAILY
COMMUNITY

## 2017-09-18 RX ORDER — PREDNISONE 20 MG/1
60 TABLET ORAL DAILY
Qty: 15 TAB | Refills: 0 | Status: SHIPPED | OUTPATIENT
Start: 2017-09-18 | End: 2017-09-23

## 2017-09-18 RX ORDER — OXYCODONE HYDROCHLORIDE 10 MG/1
10 TABLET ORAL
COMMUNITY

## 2017-09-18 RX ADMIN — ALBUTEROL SULFATE 5 MG: 2.5 SOLUTION RESPIRATORY (INHALATION) at 11:38

## 2017-09-18 RX ADMIN — Medication 10 ML: at 13:04

## 2017-09-18 RX ADMIN — FAMOTIDINE 20 MG: 10 INJECTION INTRAVENOUS at 13:03

## 2017-09-18 RX ADMIN — ONDANSETRON 4 MG: 2 SOLUTION INTRAMUSCULAR; INTRAVENOUS at 14:39

## 2017-09-18 RX ADMIN — Medication 10 ML: at 14:25

## 2017-09-18 RX ADMIN — ONDANSETRON 8 MG: 2 SOLUTION INTRAMUSCULAR; INTRAVENOUS at 06:10

## 2017-09-18 RX ADMIN — DIPHENHYDRAMINE HYDROCHLORIDE 50 MG: 50 INJECTION INTRAMUSCULAR; INTRAVENOUS at 13:09

## 2017-09-18 RX ADMIN — Medication 10 ML: at 14:39

## 2017-09-18 RX ADMIN — IOPAMIDOL 100 ML: 755 INJECTION, SOLUTION INTRAVENOUS at 14:25

## 2017-09-18 NOTE — ED NOTES
Bedside shift change report given to Malou Lemus (oncoming nurse) by Regi Taylor (offgoing nurse). Report included the following information SBAR and ED Summary. Pt reported right upper quadrant pain on arrival, now reports intermittent mid abdominal pain.

## 2017-09-18 NOTE — ED NOTES
Notified by radiology that pt will go for lung study at approximately 1100 today. MD and pt notified, pt understands that this is because she has allergy to shellfish and can't have contrast die. Pt states, \"I think I've had the contrast dye before, I can't remember, I know I can have the nuclear medicine. \"

## 2017-09-18 NOTE — ED NOTES
Pt had an appointment with vascular surgery this morning at 0900, I called to let them know she was in the ED and unable to make the appointment. Pt has gangrenous right 5th toe. Pedal pulses strong bilaterally, no streaking noted to area. Pt denies pain. See wound flow sheet.

## 2017-09-18 NOTE — ED PROVIDER NOTES
HPI Comments: Radha Olvera is a 79 y.o. Female, with a PMHx of HTN, DM, asthma, CAD, CHF, and COPD, who presents via EMS to the ED c/o nausea with associated symptom of multiple episodes of vomiting x 1 hour. She additionally reports symptoms of elevated blood pressure and SOB. Pt denies going to bed asymptomatic last night. She reports she had noticed an elevation in her blood pressure (178/140) this AM, which had improved to \"111 over something. \" She states she is SOB secondary to COPD. Despite her cardiac history, she reports a use of tobacco products. Pt specifically denies any h/o bowel obstruction. Social hx: + Tobacco use, - EtOH use, - Illicit drug use    PCP: Tanmay Brady MD    There are no other complaints, changes or physical findings at this time. The history is provided by the patient and the EMS personnel. No  was used. Past Medical History:   Diagnosis Date    Anxiety state, unspecified 2001    Dr. Corin Triana Asthma childhood    Dr. Corey Nageotte and     CAD (coronary artery disease)     Dr. Roberto Lopez at [facility] Cellulitis and abscess of leg 05/01/10    Right LE. Hospitalized    Chickenpox childhood    Chronic ITP (idiopathic thrombocytopenia) (HCC)     Dr. Cheyenne Russo. Dr. Kristen Phalen    Chronic obstructive pulmonary disease [facility])    Tiffany Burroughs 2001    Dr. Janett Christian.  Diabetes ([facility] [address] 2000    Diverticulosis of sigmoid 04/2010    Dr. Anderson Zheng    HCV (hepatitis C virus) 2004     Dr. Evangelist Swenson - took [facility] finish 1/2016    Hemorrhoids 2010    Internal.  Dr. Augustine Pearson High cholesterol 2000    Hypertension 2000    Ill-defined condition     high cholesterol    Knee pain     Right. Dr. Chris King.  Low back pain 12/27/2009    Dr. Daniela Turcios.  Rectal bleeding     Right sided sciatica 01/24/10    Dr. Danial Osorio [facility]) 2013    Dr. Shane Weeks at [facility]. Dr. Gabby Merino, pulm.     Toe ulcer, right [facility]) 2011    Right great toe. Dr. Kellie Alexander of toe ([facility] [address] 05/01/10    Dr. Blake Hong    Unspecified vitamin D deficiency 10/2011    Ureteral stone 08/2010    Dr. Kasandra Salazar       Past Surgical History:   Procedure Laterality Date    CABG, ARTERIAL, SINGLE  2005    Dr. Panda Ballard  2004? CABG x1 @ [facility]    COLONOSCOPY  04/09/10    Dr. Winston Lucio  08/2010    Dr. Farrah Burger    EGD  04/09/10    Dr. Carrie Ariza. due q 10 yrs.  HC LUMB/SACRAL EPID INJ W/CATH SURG & FLUORO EX  02/09/10    due to lumbar radiculopathy and slipped disk. Dr. Jonny Altamirano and Dr. Sridevi Joseph, radiologist    HX HEART CATHETERIZATION  2009    HX HERNIA REPAIR  11/30/2009    Rt ventral.  Dr. Braydon Hernandez    due to fibroids.  HX SPLENECTOMY  2004    [facility]   Dr. Newton Regalado.  due to low plateletes    HX TONSILLECTOMY           Family History:   Problem Relation Age of Onset    Diabetes Mother     Heart Disease Mother     Hypertension Mother     Other Mother      severe diverticulitis    Arthritis-osteo Mother     Bipolar Disorder Sister     No Known Problems Brother     Heart Disease Maternal Grandfather     Cancer Maternal Grandfather      colon    Hypertension Maternal Grandfather     Stroke Maternal Grandfather        Social History     Social History    Marital status: SINGLE     Spouse name: N/A    Number of children: N/A    Years of education: N/A     Occupational History    Not on file. Social History Main Topics    Smoking status: Current Some Day Smoker     Packs/day: 1.00     Years: 35.00     Types: Cigarettes    Smokeless tobacco: Never Used    Alcohol use Yes      Comment: socially    Drug use: No    Sexual activity: No     Other Topics Concern    Not on file     Social History Narrative         ALLERGIES: Penicillins;  Shellfish containing products; Povidone-iodine; Prednisone micronized (bulk); and Spiriva with handihaler [tiotropium bromide]    Review of Systems   Constitutional: Negative for chills, diaphoresis, fever and unexpected weight change. HENT: Negative for congestion and sore throat. Respiratory: Positive for shortness of breath. Negative for cough. Cardiovascular: Negative for chest pain. -WALTER   Gastrointestinal: Positive for nausea and vomiting. Negative for diarrhea. Genitourinary: Negative for dysuria, frequency and vaginal discharge. Musculoskeletal: Negative for arthralgias and neck pain. Skin: Negative for rash and wound. Neurological: Negative for dizziness, syncope and headaches.        -focal weakness     Patient Vitals for the past 12 hrs:   Temp Pulse Resp BP SpO2   09/18/17 1446 98.3 °F (36.8 °C) 83 16 143/74 99 %   09/18/17 1230 - 81 15 139/71 93 %   09/18/17 1200 - 80 16 140/66 92 %   09/18/17 1138 - - - - 93 %   09/18/17 1133 - 78 14 144/72 92 %   09/18/17 1030 - 75 15 136/72 92 %   09/18/17 1000 98 °F (36.7 °C) - 16 - -   09/18/17 0830 - 74 15 123/69 95 %   09/18/17 0800 - 73 15 122/76 93 %   09/18/17 0716 97.4 °F (36.3 °C) 75 16 119/71 94 %   09/18/17 0542 96.1 °F (35.6 °C) 91 28 178/88 96 %       Physical Exam   Constitutional: She is oriented to person, place, and time. No distress. Appears uncomfortable but not in any distress   HENT:   Head: Normocephalic and atraumatic. Eyes: Right eye exhibits no discharge. Left eye exhibits no discharge. No conjunctival redness   Neck: Neck supple. No tracheal deviation present. Cardiovascular: Normal rate and regular rhythm. Exam reveals no gallop and no friction rub. No murmur heard. Pulmonary/Chest: She has no wheezes. She has no rhonchi. She has no rales. CTA   Abdominal: Soft. Bowel sounds are normal. She exhibits distension. There is no tenderness.    Moderate bowel distention with slight ventral hernia   Genitourinary:   Genitourinary Comments: No perineal lesions, no CVA tenderness   Musculoskeletal: She exhibits no edema. Back: No point tenderness, no bony tenderness, no discoloration or swelling  Legs: No edema   Lymphadenopathy:     She has no cervical adenopathy. Neurological: She is alert and oriented to person, place, and time. No focal weakness, no changes in vision or hearing   Skin: Skin is warm. No lesion and no rash noted. She is not diaphoretic. Psychiatric: Thought content normal. Her mood appears anxious. MDM  Number of Diagnoses or Management Options  Diagnosis management comments:     DDx: R/o CHF, possible gastroenteritis, possible food poisoning, possible bowel obstruction, r/o PNA       Amount and/or Complexity of Data Reviewed  Clinical lab tests: ordered and reviewed  Tests in the radiology section of CPT®: ordered and reviewed  Tests in the medicine section of CPT®: ordered and reviewed  Obtain history from someone other than the patient: yes (EMS)  Review and summarize past medical records: yes  Discuss the patient with other providers: yes (PCP, radiology)  Independent visualization of images, tracings, or specimens: yes    Patient Progress  Patient progress: stable    ED Course       Procedures     EKG interpretation: (Preliminary): 7597  Rhythm: normal sinus rhythm; and regular . Rate (approx.): 78 bpm; Axis: normal; GA interval: normal; QRS interval: normal ; ST/T wave: normal; Other findings: left atrial enlargement; borderline EKG. This note is prepared by Sarah Coulter, acting as Scribe for Primo Lua MD.    SIGN OUT:  6:56 AM  Patient's presentation, labs/imaging and plan of care was reviewed with Vivien Arshad MD as part of sign out. They will wait for XR and CT and dispo appropriately as part of the plan discussed with the patient.       ATTESTATION:   This note is prepared by Sarah Coulter, acting as Scribe for Primo Lua MD.    Primo Lua MD: The scribe's documentation has been prepared under my direction and personally reviewed by me in its entirety. I confirm that the note above accurately reflects all work, treatment, procedures, and medical decision making performed by me. CONSULT NOTE:   12:37 PM  Dali Gordon MD spoke with Rios Sutherland MD,   Specialty: PCP  Discussed pt's hx, disposition, and available diagnostic and imaging results. Reviewed care plans. Consultant agrees with plans as outlined. He states pt has not had any complications with CT in the past and recommends speaking with Radiology. He also recommends pre-medicating her and going ahead with the study. He requests having the pt f/u with him in the office tomorrow. PROGRESS NOTE  12:44 PM   Spoke with CT tech, who will review chart and will discuss their recommendations. PROGRESS NOTE  12:55 PM   Spoke with the CT tech, who recommends giving pt 50mg Benadryl IV. PROGRESS NOTE  2:53 PM   Discussed results with pt and the need to f/u with Aaliyah Reyes. Now she is c/o some dizziness. However, she agrees to follow up with Dr. Aaliyah Reyes tomorrow and has been given return precautions. LABORATORY TESTS:  Recent Results (from the past 12 hour(s))   CBC WITH AUTOMATED DIFF    Collection Time: 09/18/17  6:06 AM   Result Value Ref Range    WBC 14.8 (H) 3.6 - 11.0 K/uL    RBC 4.34 3.80 - 5.20 M/uL    HGB 12.4 11.5 - 16.0 g/dL    HCT 38.1 35.0 - 47.0 %    MCV 87.8 80.0 - 99.0 FL    MCH 28.6 26.0 - 34.0 PG    MCHC 32.5 30.0 - 36.5 g/dL    RDW 15.3 (H) 11.5 - 14.5 %    PLATELET  289 - 193 K/uL     UNABLE TO REPORT ACCURATE COUNT DUE TO PLATELET AGGREGATION, HOWEVER, PLATELETS APPEAR NORMAL IN NUMBER ON SMEAR. PLEASE RESUBMIT SODIUM CITRATE (BLUE) AND EDTA (LAVENDER) TUBES FOR HEMATOLOGICAL TESTING. NEUTROPHILS 54 32 - 75 %    BAND NEUTROPHILS 1 0 - 6 %    LYMPHOCYTES 33 12 - 49 %    MONOCYTES 11 5 - 13 %    EOSINOPHILS 1 0 - 7 %    BASOPHILS 0 0 - 1 %    ABS. NEUTROPHILS 8.2 (H) 1.8 - 8.0 K/UL    ABS. LYMPHOCYTES 4.9 (H) 0.8 - 3.5 K/UL    ABS.  MONOCYTES 1.6 (H) 0.0 - 1.0 K/UL    ABS. EOSINOPHILS 0.1 0.0 - 0.4 K/UL    ABS. BASOPHILS 0.0 0.0 - 0.1 K/UL    DF MANUAL      RBC COMMENTS ANISOCYTOSIS  1+       METABOLIC PANEL, COMPREHENSIVE    Collection Time: 09/18/17  6:06 AM   Result Value Ref Range    Sodium 136 136 - 145 mmol/L    Potassium 4.1 3.5 - 5.1 mmol/L    Chloride 101 97 - 108 mmol/L    CO2 26 21 - 32 mmol/L    Anion gap 9 5 - 15 mmol/L    Glucose 168 (H) 65 - 100 mg/dL    BUN 20 6 - 20 MG/DL    Creatinine 1.02 0.55 - 1.02 MG/DL    BUN/Creatinine ratio 20 12 - 20      GFR est AA >60 >60 ml/min/1.73m2    GFR est non-AA 54 (L) >60 ml/min/1.73m2    Calcium 8.9 8.5 - 10.1 MG/DL    Bilirubin, total 0.4 0.2 - 1.0 MG/DL    ALT (SGPT) 16 12 - 78 U/L    AST (SGOT) 21 15 - 37 U/L    Alk.  phosphatase 88 45 - 117 U/L    Protein, total 8.9 (H) 6.4 - 8.2 g/dL    Albumin 3.2 (L) 3.5 - 5.0 g/dL    Globulin 5.7 (H) 2.0 - 4.0 g/dL    A-G Ratio 0.6 (L) 1.1 - 2.2     LIPASE    Collection Time: 09/18/17  6:06 AM   Result Value Ref Range    Lipase 204 73 - 393 U/L   CULTURE, BLOOD    Collection Time: 09/18/17  6:06 AM   Result Value Ref Range    Special Requests: NO SPECIAL REQUESTS      Culture result: NO GROWTH AFTER 7 HOURS     CULTURE, BLOOD    Collection Time: 09/18/17  6:06 AM   Result Value Ref Range    Special Requests: NO SPECIAL REQUESTS      Culture result: NO GROWTH AFTER 7 HOURS     NT-PRO BNP    Collection Time: 09/18/17  6:06 AM   Result Value Ref Range    NT pro- (H) 0 - 125 PG/ML   EKG, 12 LEAD, INITIAL    Collection Time: 09/18/17  6:37 AM   Result Value Ref Range    Ventricular Rate 78 BPM    Atrial Rate 78 BPM    P-R Interval 152 ms    QRS Duration 86 ms    Q-T Interval 410 ms    QTC Calculation (Bezet) 467 ms    Calculated P Axis 56 degrees    Calculated R Axis 69 degrees    Calculated T Axis 89 degrees    Diagnosis       Normal sinus rhythm  Left atrial enlargement  Nonspecific intraventricular conduction delay  When compared with ECG of 11-FEB-2016 10:02,  No significant change was found  Confirmed by Bruno Smith ([id number]) on 9/18/2017 3:56:57 PM     D DIMER    Collection Time: 09/18/17  6:44 AM   Result Value Ref Range    D-dimer 1.29 (H) 0.00 - 0.65 mg/L FEU   TROPONIN I    Collection Time: 09/18/17  6:44 AM   Result Value Ref Range    Troponin-I, Qt. <0.04 <0.05 ng/mL   CK W/ CKMB & INDEX    Collection Time: 09/18/17  6:44 AM   Result Value Ref Range    CK 89 26 - 192 U/L    CK - MB 1.0 <3.6 NG/ML    CK-MB Index 1.1 0 - 2.5     LACTIC ACID    Collection Time: 09/18/17  6:50 AM   Result Value Ref Range    Lactic acid 1.9 0.4 - 2.0 MMOL/L   URINALYSIS W/ REFLEX CULTURE    Collection Time: 09/18/17  9:53 AM   Result Value Ref Range    Color YELLOW/STRAW      Appearance CLOUDY (A) CLEAR      Specific gravity 1.020 1.003 - 1.030      pH (UA) 6.0 5.0 - 8.0      Protein 100 (A) NEG mg/dL    Glucose NEGATIVE  NEG mg/dL    Ketone NEGATIVE  NEG mg/dL    Bilirubin NEGATIVE  NEG      Blood LARGE (A) NEG      Urobilinogen 0.2 0.2 - 1.0 EU/dL    Nitrites NEGATIVE  NEG      Leukocyte Esterase LARGE (A) NEG      WBC 10-20 0 - 4 /hpf    RBC 20-50 0 - 5 /hpf    Epithelial cells FEW FEW /lpf    Bacteria 1+ (A) NEG /hpf    UA:UC IF INDICATED URINE CULTURE ORDERED (A) CNI         IMAGING RESULTS:  NM LUNG VENT/PERF   Final Result   EXAM:  NM LUNG VENT/PERF      INDICATION: Short of breath.     COMPARISON: None.     TRACER:   10 mCi of Xenon-133 gas. 4 mCi of Tc-99m MAA.     FINDINGS:   Ventilation lung imaging was performed following inhalation of Xenon-133 gas. The patient was ventilated to equilibrium and images were obtained from the  posterior projection during washout.     There is a ventilatory defect in the right midlung laterally. There is bilateral  gas trapping.     Perfusion lung imaging was performed following intravenous administration of  Tc-99m MAA.  Images were obtained from the anterior, posterior and right and left  anterior and posterior oblique projections.     There is a matched segmental perfusion abnormality in the right midlung  laterally which corresponds to the chest radiographic abnormality in the right  upper lobe. There is a moderate segmental mismatched perfusion abnormality in  the right lower lobe.      IMPRESSION  IMPRESSION: Intermediate probability for pulmonary embolism. CT Results  (Last 48 hours)               09/18/17 1424  CTA CHEST W OR W WO CONT Final result    Impression:  IMPRESSION:    1. There is no acute pulmonary embolism. 2. Findings of interstitial lung disease with honeycombing and traction   bronchiectasis predominantly involving the bilateral upper lobes. There is also   nonspecific soft tissue attenuation in the bilateral toan and subcarinal spaces   without distinct lymphadenopathy. The pattern of interstitial lung disease   raises the possibility of an underlying systemic inflammatory process, such as   sarcoidosis or scleroderma. 3. There is a nonspecific opacity in the right upper lung measuring 1.1 x 1.9   cm. Follow-up chest CT in 8-10 weeks is suggested. Narrative:  EXAM:  CTA CHEST W OR W WO CONT       INDICATION:  Shortness of breath for 8 hours. Elevated d-dimer. Intermediate   probability for acute PE on recent VQ scan. COMPARISON: Chest radiograph 9/18/2017. CT abdomen/pelvis 9/18/2017. VQ scan   9/18/2017. TECHNIQUE: Helical thin section chest CT following uneventful intravenous   administration of nonionic contrast according to departmental PE protocol. Coronal and sagittal reformats were performed. 3D/MIP post processing was   performed. CT dose reduction was achieved through use of a standardized protocol   tailored for this examination and automatic exposure control for dose   modulation. FINDINGS: This is a good quality study for the evaluation of pulmonary embolism   to the first subsegmental arterial level. There is no pulmonary embolism to this   level. The aorta and main pulmonary artery are normal in caliber. Surgical changes are   seen following CABG. Cardiac size is within normal limits. No pericardial   effusion. There is mild diffuse soft tissue attenuation in the right greater than left   hilar and subcarinal spaces. No discrete enlarged lymph nodes are identified. There are scattered bilateral areas of subpleural reticular opacity with   honeycombing and traction bronchiectasis predominantly involving the bilateral   upper lobes. There is a nonspecific opacity in the right upper lobe measuring   1.1 x 1.9 cm (series 4, image 70). No pleural effusion or pneumothorax. Central   airways are unremarkable. There is no acute abnormality in the visualized upper abdomen. There is no   aggressive bony lesion. 09/18/17 0628  CT ABD PELV WO CONT Final result    Impression:  IMPRESSION:    1. No acute abdominal or pelvic abnormality. 2. Status post median sternotomy. 3. Status post splenectomy. 4. Small nonobstructing right renal calculus with right ureteral stent. 5. Atherosclerotic abdominal aorta without aneurysm. 6. Ventral hernia containing transverse colon without evidence of bowel   obstruction. 7. Lumbar spondylosis. Narrative:  EXAM: CT ABDOMEN AND PELVIS WITHOUT CONTRAST   INDICATION:  Nausea and vomiting. COMPARISON: 4/25/2017. CONTRAST: None. TECHNIQUE:    Unenhanced multislice helical CT was performed from the diaphragm to the   symphysis pubis without intravenous contrast administration. Oral contrast was   not administered. Contiguous 5 mm axial images were reconstructed and lung and   soft tissue windows were generated. Coronal and sagittal reformations were   generated. CT dose reduction was achieved through use of a standardized protocol   tailored for this examination and automatic exposure control for dose   modulation. FINDINGS:   The patient is obese.      LOWER CHEST: The visualized portions of the lung bases are clear. There is mild   atelectasis in the lower lobes. There are sternal sutures. The absence of intravenous contrast material reduces the sensitivity for   evaluation of the solid parenchymal organs of the abdomen. ABDOMEN:   Liver: The liver is normal in size and contour with no focal abnormality. Gallbladder and bile ducts: There are no calcified stones and there is no   biliary duct dilatation. Spleen: There are surgical clips in the left upper quadrant and the spleen is   absent. Pancreas: No abnormality. Adrenal glands: No abnormality. Kidneys: No focal parenchymal abnormality. There is a small nonobstructing right   renal calculus and there is a right ureteral stent. PELVIS:   Reproductive organs: The uterus is absent. Bladder: No abnormality. RETROPERITONEUM: The aorta is atherosclerotic and tapers without aneurysm. There   is no retroperitoneal adenopathy or mass. There is no pelvic mass or adenopathy. BOWEL AND MESENTERY: The small bowel is normal. The appendix is normal.   PERITONEUM: There is no ascites or free intraperitoneal air. BONES AND SOFT TISSUES: There is an anterior abdominal wall hernia containing   transverse colon without evidence of bowel obstruction. There are degenerative   changes of the lumbar spine. CXR Results  (Last 48 hours)               09/18/17 0627  XR CHEST PORT Final result    Impression:  IMPRESSION: Status post median sternotomy. No acute abnormality. Narrative:  EXAM:  XR CHEST PORT. INDICATION: Chest pain. COMPARISON: 3/23/2016. FINDINGS:    A portable AP radiograph of the chest was obtained at 0620 hours. There are   sternal sutures. Lines and tubes: The patient is on a cardiac monitor. Lungs: The lungs are clear. Pleura: There is no pneumothorax or pleural effusion.    Mediastinum: The cardiac and mediastinal contours and pulmonary vascularity are   normal. Bones and soft tissues: The bones and soft tissues are grossly within normal   limits. MEDICATIONS GIVEN:  Medications   sodium chloride (NS) flush 10 mL (10 mL IntraVENous Given 9/18/17 1439)   sodium chloride (NS) flush 5-10 mL (not administered)   ondansetron (ZOFRAN) injection 8 mg (8 mg IntraVENous Given 9/18/17 0610)   albuterol (PROVENTIL VENTOLIN) nebulizer solution 5 mg (5 mg Nebulization Given 9/18/17 1138)   xenon xe 022 gas 10 millicurie (10 millicuries Inhalation Given 9/18/17 1100)   technetium albumin aggregated (MAA) solution 4 millicurie (4 millicuries IntraVENous Given 9/18/17 1100)   famotidine (PF) (PEPCID) 20 mg in sodium chloride 0.9 % 10 mL injection (20 mg IntraVENous Given 9/18/17 1303)   diphenhydrAMINE (BENADRYL) injection 50 mg (50 mg IntraVENous Given 9/18/17 1309)   iopamidol (ISOVUE-370) 76 % injection 100 mL (100 mL IntraVENous Given 9/18/17 1425)   ondansetron (ZOFRAN) injection 4 mg (4 mg IntraVENous Given 9/18/17 1439)       IMPRESSION:  1. Acute exacerbation of chronic obstructive pulmonary disease (COPD) ([address]    2. SOB (shortness of breath)    3. Dizziness        PLAN:  1. Current Discharge Medication List      START taking these medications    Details   predniSONE (DELTASONE) 20 mg tablet Take 3 Tabs by mouth daily for 5 days. Qty: 15 Tab, Refills: 0           2. Follow-up Information     Follow up With Details Comments Contact Info    May Sharp MD In 1 day  [place]   [address] [facility] [address] [id number]  [phone]      [facility] EMERGENCY DEPT  As needed, If symptoms worsen [facility]  [phone]          Return to ED if worse     Discharge Note:  2:54 PM  The patient has been re-evaluated and is ready for discharge. Reviewed available results with patient. Counseled patient on diagnosis and care plan. Patient has expressed understanding, and all questions have been answered.  Patient agrees with plan and agrees to follow up as recommended, or to return to the ED if their symptoms worsen. Discharge instructions have been provided and explained to the patient, along with reasons to return to the ED. ATTESTATION:   This note is prepared by Dale Muller, acting as Scribe for Charito Torres MD.    Charito Torres MD: The scribe's documentation has been prepared under my direction and personally reviewed by me in its entirety. I confirm that the note above accurately reflects all work, treatment, procedures, and medical decision making performed by me.

## 2017-09-18 NOTE — ED NOTES
Pt presents via EMS to ED complaining of n/v that started \"just before I came here\" and shortness of breath. Pt is alert and oriented x 4, RR even and unlabored, skin is cool and clammy. Assesment completed and pt updated on plan of care. Emergency Department Nursing Plan of Care       The Nursing Plan of Care is developed from the Nursing assessment and Emergency Department Attending provider initial evaluation. The plan of care may be reviewed in the ED Provider note.     The Plan of Care was developed with the following considerations:   Patient / Family readiness to learn indicated by:verbalized understanding  Persons(s) to be included in education: patient  Barriers to Learning/Limitations:No    Signed     Shea Gonzáles RN    9/18/2017   5:53 AM

## 2017-09-18 NOTE — DISCHARGE INSTRUCTIONS
Chronic Obstructive Pulmonary Disease (COPD): Care Instructions  Your Care Instructions    Chronic obstructive pulmonary disease (COPD) is a general term for a group of lung diseases, including emphysema and chronic bronchitis. People with COPD have decreased airflow in and out of the lungs, which makes it hard to breathe. The airways also can get clogged with thick mucus. Cigarette smoking is a major cause of COPD. Although there is no cure for COPD, you can slow its progress. Following your treatment plan and taking care of yourself can help you feel better and live longer. Follow-up care is a key part of your treatment and safety. Be sure to make and go to all appointments, and call your doctor if you are having problems. It's also a good idea to know your test results and keep a list of the medicines you take. How can you care for yourself at home? Staying healthy  · Do not smoke. This is the most important step you can take to prevent more damage to your lungs. If you need help quitting, talk to your doctor about stop-smoking programs and medicines. These can increase your chances of quitting for good. · Avoid colds and flu. Get a pneumococcal vaccine shot. If you have had one before, ask your doctor whether you need a second dose. Get the flu vaccine every fall. If you must be around people with colds or the flu, wash your hands often. · Avoid secondhand smoke, air pollution, and high altitudes. Also avoid cold, dry air and hot, humid air. Stay at home with your windows closed when air pollution is bad. Medicines and oxygen therapy  · Take your medicines exactly as prescribed. Call your doctor if you think you are having a problem with your medicine. · You may be taking medicines such as:  ¨ Bronchodilators. These help open your airways and make breathing easier. Bronchodilators are either short-acting (work for 6 to 9 hours) or long-acting (work for 24 hours).  You inhale most bronchodilators, so they start to act quickly. Always carry your quick-relief inhaler with you in case you need it while you are away from home. ¨ Corticosteroids (prednisone, budesonide). These reduce airway inflammation. They come in pill or inhaled form. You must take these medicines every day for them to work well. · A spacer may help you get more inhaled medicine to your lungs. Ask your doctor or pharmacist if a spacer is right for you. If it is, ask how to use it properly. · Do not take any vitamins, over-the-counter medicine, or herbal products without talking to your doctor first.  · If your doctor prescribed antibiotics, take them as directed. Do not stop taking them just because you feel better. You need to take the full course of antibiotics. · Oxygen therapy boosts the amount of oxygen in your blood and helps you breathe easier. Use the flow rate your doctor has recommended, and do not change it without talking to your doctor first.  Activity  · Get regular exercise. Walking is an easy way to get exercise. Start out slowly, and walk a little more each day. · Pay attention to your breathing. You are exercising too hard if you cannot talk while you are exercising. · Take short rest breaks when doing household chores and other activities. · Learn breathing methods--such as breathing through pursed lips--to help you become less short of breath. · If your doctor has not set you up with a pulmonary rehabilitation program, talk to him or her about whether rehab is right for you. Rehab includes exercise programs, education about your disease and how to manage it, help with diet and other changes, and emotional support. Diet  · Eat regular, healthy meals. Use bronchodilators about 1 hour before you eat to make it easier to eat. Eat several small meals instead of three large ones. Drink beverages at the end of the meal. Avoid foods that are hard to chew.   · Eat foods that contain protein so that you do not lose muscle mass.  · Talk with your doctor if you gain too much weight or if you lose weight without trying. Mental health  · Talk to your family, friends, or a therapist about your feelings. It is normal to feel frightened, angry, hopeless, helpless, and even guilty. Talking openly about bad feelings can help you cope. If these feelings last, talk to your doctor. When should you call for help? Call 911 anytime you think you may need emergency care. For example, call if:  · You have severe trouble breathing. Call your doctor now or seek immediate medical care if:  · You have new or worse trouble breathing. · You cough up blood. · You have a fever. Watch closely for changes in your health, and be sure to contact your doctor if:  · You cough more deeply or more often, especially if you notice more mucus or a change in the color of your mucus. · You have new or worse swelling in your legs or belly. · You are not getting better as expected. Where can you learn more? Go to http://milka-brenda.info/. Raj Alexander in the search box to learn more about \"Chronic Obstructive Pulmonary Disease (COPD): Care Instructions. \"  Current as of: March 25, 2017  Content Version: 11.3  © 4016-5179 [employer]. Care instructions adapted under license by [employer] (which disclaims liability or warranty for this information). If you have questions about a medical condition or this instruction, always ask your healthcare professional. [place] [address] any warranty or liability for your use of this information. Dizziness: Care Instructions  Your Care Instructions  Dizziness is the feeling of unsteadiness or fuzziness in your head. It is different than having vertigo, which is a feeling that the room is spinning or that you are moving or falling. It is also different from lightheadedness, which is the feeling that you are about to faint.   It can be hard to know what causes dizziness. Some people feel dizzy when they have migraine headaches. Sometimes bouts of flu can make you feel dizzy. Some medical conditions, such as heart problems or high blood pressure, can make you feel dizzy. Many medicines can cause dizziness, including medicines for high blood pressure, pain, or anxiety. If a medicine causes your symptoms, your doctor may recommend that you stop or change the medicine. If it is a problem with your heart, you may need medicine to help your heart work better. If there is no clear reason for your symptoms, your doctor may suggest watching and waiting for a while to see if the dizziness goes away on its own. Follow-up care is a key part of your treatment and safety. Be sure to make and go to all appointments, and call your doctor if you are having problems. It's also a good idea to know your test results and keep a list of the medicines you take. How can you care for yourself at home? · If your doctor recommends or prescribes medicine, take it exactly as directed. Call your doctor if you think you are having a problem with your medicine. · Do not drive while you feel dizzy. · Try to prevent falls. Steps you can take include:  ¨ Using nonskid mats, adding grab bars near the tub, and using night-lights. ¨ Clearing your home so that walkways are free of anything you might trip on. ¨ Letting family and friends know that you have been feeling dizzy. This will help them know how to help you. When should you call for help? Call 911 anytime you think you may need emergency care. For example, call if:  · You passed out (lost consciousness). · You have dizziness along with symptoms of a heart attack. These may include:  ¨ Chest pain or pressure, or a strange feeling in the chest.  ¨ Sweating. ¨ Shortness of breath. ¨ Nausea or vomiting. ¨ Pain, pressure, or a strange feeling in the back, neck, jaw, or upper belly or in one or both shoulders or arms.   ¨ Lightheadedness or sudden weakness. ¨ A fast or irregular heartbeat. · You have symptoms of a stroke. These may include:  ¨ Sudden numbness, tingling, weakness, or loss of movement in your face, arm, or leg, especially on only one side of your body. ¨ Sudden vision changes. ¨ Sudden trouble speaking. ¨ Sudden confusion or trouble understanding simple statements. ¨ Sudden problems with walking or balance. ¨ A sudden, severe headache that is different from past headaches. Call your doctor now or seek immediate medical care if:  · You feel dizzy and have a fever, headache, or ringing in your ears. · You have new or increased nausea and vomiting. · Your dizziness does not go away or comes back. Watch closely for changes in your health, and be sure to contact your doctor if:  · You do not get better as expected. Where can you learn more? Go to http://milka-brenda.info/. Enter E943 in the search box to learn more about \"Dizziness: Care Instructions. \"  Current as of: March 20, 2017  Content Version: 11.3  © 8853-8657 [employer]. Care instructions adapted under license by [employer] (which disclaims liability or warranty for this information). If you have questions about a medical condition or this instruction, always ask your healthcare professional. [address] any warranty or liability for your use of this information.

## 2017-09-18 NOTE — ED NOTES
I spoke to the pt's son, Tejas Cabello, by phone with pt's permission. Tejas Cabello is on his way here. Pt reports she is a smoker, sudden onset of shortness of breath and hypertension approximately 9pm last night at rest.  Pt reports taking all of her home medications as directed yesterday. Pt reports onset of vomiting at approximately 0300 this morning, that woke her up. Pt denies diarrhea. Pt denies history of blood clots, pt updated on plan of care to have nuclear medicine imaging of lungs to check for blood clot.

## 2017-09-19 LAB
BACTERIA SPEC CULT: NORMAL
CC UR VC: NORMAL
SERVICE CMNT-IMP: NORMAL

## 2017-09-23 LAB
BACTERIA SPEC CULT: NORMAL
BACTERIA SPEC CULT: NORMAL
SERVICE CMNT-IMP: NORMAL
SERVICE CMNT-IMP: NORMAL

## 2017-12-05 ENCOUNTER — HOSPITAL ENCOUNTER (OUTPATIENT)
Dept: MAMMOGRAPHY | Age: 67
Discharge: HOME | End: 2017-12-05
Attending: FAMILY MEDICINE
Payer: MEDICARE

## 2017-12-05 ENCOUNTER — HOSPITAL ENCOUNTER (OUTPATIENT)
Dept: BONE DENSITY | Age: 67
Discharge: HOME | End: 2017-12-05
Attending: FAMILY MEDICINE
Payer: MEDICARE

## 2017-12-05 DIAGNOSIS — M81.0 OSTEOPOROSIS: ICD-10-CM

## 2017-12-05 DIAGNOSIS — Z12.31 VISIT FOR SCREENING MAMMOGRAM: ICD-10-CM

## 2017-12-05 PROCEDURE — 77080 DXA BONE DENSITY AXIAL: CPT

## 2017-12-05 PROCEDURE — 77067 SCR MAMMO BI INCL CAD: CPT

## 2017-12-21 ENCOUNTER — HOSPITAL ENCOUNTER (OUTPATIENT)
Dept: VASCULAR SURGERY | Age: 67
Discharge: HOME | End: 2017-12-21
Attending: FAMILY MEDICINE
Payer: MEDICARE

## 2017-12-21 DIAGNOSIS — E11.51 TYPE II DIABETES MELLITUS WITH PERIPHERAL CIRCULATORY DISORDER (HCC): ICD-10-CM

## 2017-12-21 PROCEDURE — 93922 UPR/L XTREMITY ART 2 LEVELS: CPT

## 2017-12-21 NOTE — PROCEDURES
[facility]  *** FINAL REPORT ***    Name: Ananya Finley  MRN: [id number]    Outpatient  : 13 Aug 1950  HIS Order #: [id number]  [id number] [facility] Visit #: [id number]  Date: 21 Dec 2017    TYPE OF TEST: Peripheral Arterial Testing    REASON FOR TEST  Rest pain (both sides)    Right Leg  Segmentals: Abnormal                     mmHg  Brachial         155  High thigh  Low thigh  Calf  Posterior tibial 148  Dorsalis pedis   142  Peroneal  Metatarsal  Toe pressure      27  Doppler:    Abnormal  PVR:  Ankle/Brachial: 0.95    Left Leg  Segmentals: Abnormal                     mmHg  Brachial         146  High thigh  Low thigh  Calf  Posterior tibial 132  Dorsalis pedis   112  Peroneal  Metatarsal  Toe pressure      81  Doppler:    Abnormal  PVR:  Ankle/Brachial: 0.85    INTERPRETATION/FINDINGS  1. Mild peripheral arterial disease indicated at rest in the right  leg. 2. Mild peripheral arterial disease indicated at rest in the left leg. 3. The right ankle/brachial index is 0.95 and the left ankle/brachial  index is 0.85.  4. Severe arterial disease indicated at rest of the right great toe. 5. Moderate arterial disease indicated at rest of the left great toe. 6. The right toe/brachial index is 0.17 and the left toe/brachial  index is 0.52. [facility] office was called with preliminary report. I have personally reviewed the data relevant to the interpretation of  this  study. TECHNOLOGIST: JUDY Leon  Signed: 2017 10:04 AM    PHYSICIAN: Nury Bills.  Rai Martinez MD  Signed: 2017 10:17 AM

## 2024-04-25 NOTE — ED NOTES
Pt given printed discharge instructions and 1 script(s). Pt verbalized understanding of instructions and script(s). Pt verbalized importance of following up with PCP tomorrow. Pt alert and oriented, in no acute distress, ambulatory with her daughter. Patient escorted via wheelchair to ED drive by staff tech to the [employer] vehicle. [facility]